# Patient Record
Sex: FEMALE | URBAN - METROPOLITAN AREA
[De-identification: names, ages, dates, MRNs, and addresses within clinical notes are randomized per-mention and may not be internally consistent; named-entity substitution may affect disease eponyms.]

---

## 2023-01-05 ENCOUNTER — NURSE TRIAGE (OUTPATIENT)
Dept: TELEHEALTH | Age: 73
End: 2023-01-05

## 2023-08-01 ENCOUNTER — HOSPITAL ENCOUNTER (OUTPATIENT)
Dept: CV DIAGNOSTICS | Facility: HOSPITAL | Age: 73
Discharge: HOME | End: 2023-08-01
Attending: FAMILY MEDICINE
Payer: MEDICAID

## 2023-08-01 DIAGNOSIS — R60.0 LEG EDEMA: ICD-10-CM

## 2023-08-01 DIAGNOSIS — I10 ESSENTIAL HYPERTENSION: ICD-10-CM

## 2023-08-01 PROCEDURE — 93306 TTE W/DOPPLER COMPLETE: CPT | Performed by: FAMILY MEDICINE

## 2024-11-04 ENCOUNTER — OFFICE VISIT (OUTPATIENT)
Dept: INTERNAL MEDICINE CLINIC | Facility: CLINIC | Age: 74
End: 2024-11-04
Payer: COMMERCIAL

## 2024-11-04 VITALS
SYSTOLIC BLOOD PRESSURE: 156 MMHG | OXYGEN SATURATION: 98 % | DIASTOLIC BLOOD PRESSURE: 80 MMHG | HEART RATE: 70 BPM | BODY MASS INDEX: 39.93 KG/M2 | RESPIRATION RATE: 20 BRPM | HEIGHT: 55.51 IN | WEIGHT: 175 LBS | TEMPERATURE: 98 F

## 2024-11-04 DIAGNOSIS — I10 PRIMARY HYPERTENSION: ICD-10-CM

## 2024-11-04 DIAGNOSIS — R01.1 SYSTOLIC EJECTION MURMUR: ICD-10-CM

## 2024-11-04 DIAGNOSIS — J06.9 VIRAL URI WITH COUGH: Primary | ICD-10-CM

## 2024-11-04 PROBLEM — H90.0 CONDUCTIVE HEARING LOSS, BILATERAL: Status: ACTIVE | Noted: 2024-11-04

## 2024-11-04 PROBLEM — I87.2 VENOUS INSUFFICIENCY: Status: ACTIVE | Noted: 2024-11-04

## 2024-11-04 LAB
COVID19 BINAX NOW ANTIGEN: NOT DETECTED
OPERATOR ID: NORMAL
POCT LOT NUMBER: NORMAL

## 2024-11-04 PROCEDURE — 3008F BODY MASS INDEX DOCD: CPT | Performed by: STUDENT IN AN ORGANIZED HEALTH CARE EDUCATION/TRAINING PROGRAM

## 2024-11-04 PROCEDURE — 99203 OFFICE O/P NEW LOW 30 MIN: CPT | Performed by: STUDENT IN AN ORGANIZED HEALTH CARE EDUCATION/TRAINING PROGRAM

## 2024-11-04 PROCEDURE — 3079F DIAST BP 80-89 MM HG: CPT | Performed by: STUDENT IN AN ORGANIZED HEALTH CARE EDUCATION/TRAINING PROGRAM

## 2024-11-04 PROCEDURE — 3077F SYST BP >= 140 MM HG: CPT | Performed by: STUDENT IN AN ORGANIZED HEALTH CARE EDUCATION/TRAINING PROGRAM

## 2024-11-04 RX ORDER — EPLERENONE 25 MG/1
25 TABLET, FILM COATED ORAL DAILY
COMMUNITY

## 2024-11-04 RX ORDER — BENZONATATE 100 MG/1
100 CAPSULE ORAL 3 TIMES DAILY PRN
Qty: 30 CAPSULE | Refills: 0 | Status: SHIPPED | OUTPATIENT
Start: 2024-11-04

## 2024-11-04 RX ORDER — ATORVASTATIN CALCIUM 20 MG/1
20 TABLET, FILM COATED ORAL NIGHTLY
COMMUNITY

## 2024-11-04 NOTE — PATIENT INSTRUCTIONS
Mucinex   Continue nyquil and dayquil   Flonase 2 puffs each nostril twice a day.       I have prescribed tessalon pearls to help with cough. Can take up to 3 times a day. Please monitor for constipation.     Check blood pressure midday once a day for 7 days. Please send me readings.     Please send me cardiology reports.

## 2024-11-04 NOTE — PROGRESS NOTES
CHIEF COMPLAINT:    Chief Complaint   Patient presents with    New Patient    Viral Syndrome     Post nasal, drip, cough, and cheat congestion symptoms stared x 4 days no fever no at home Covid test taking Dayquil & Nyquil         HPI, Assessment & Plan:    The patient is a 74 year old female with pmhx of HTN, HLD, and systolic ejection murmur who presents with viral symptoms.    //Viral URI   Patient having post nasal drip, cough, chest congestion x 4 days. Denies fever, wheezing, shortness of breath or chest pain. Feels cough is worse at night when laying down. Has tried nyquil and dayquil which has been helping. Denies ear pain.   PLAN:  -Flonase 2 puffs BID.   -Tessalon pearls PRN.   -Continue Nyquil and Dayquil.     //Systolic ejection murmur   Seen cardiologist in [place] and was told she has a leakage. Started on eplerenone and atorvastatin. Her telmisartan was stopped.   PLAN:   -Patient to bring echo report.   -Continue medications as prescribed by cardiologist.   -Based on echo report, will refer to cardiology.     //HTN   Elevated today. Does not check at home  PLAN:   -Continue eplerenone 25mg daily.   -If still elevated at home, plan on restarting telmisartan.     //Hearing loss  PLAN:   May need to discuss audiology visit for hearing aids.     HEALTH MAINTENANCE:   -Vaccinations: Prevnar due, Shingrix due, Flu due, COVID due  -Colonoscopy: - not indicated  -Mammogram: - due  -Pap Smear: - aged out   -Dexa scan: Due  -Diabetes screening: Last A1c value was  % done  .  -Lipid screening: No Lipid panel on file.   -Birth Control: Post-menopausal  -Smoking: denies   -Alcohol: denies  -Marijuana: denies   -Recreational drug: denies    No follow-ups on file.    Elisabeth Dias MD  Internal Medicine     Past Medical History:   Past Medical History:    Essential hypertension        Past Surgical History:   History reviewed. No pertinent surgical history.    Current Medications:    No current outpatient medications  on file.         Allergies:    Allergies as of 11/04/2024    (No Known Allergies)       SOCIAL HISTORY:    Social History     Socioeconomic History    Marital status: Unknown     Spouse name: Not on file    Number of children: Not on file    Years of education: Not on file    Highest education level: Not on file   Occupational History    Not on file   Tobacco Use    Smoking status: Never     Passive exposure: Never    Smokeless tobacco: Never   Vaping Use    Vaping status: Not on file   Substance and Sexual Activity    Alcohol use: Never    Drug use: Never    Sexual activity: Not on file   Other Topics Concern    Not on file   Social History Narrative    Not on file     Social Drivers of Health     Financial Resource Strain: Not on file   Food Insecurity: Not on file   Transportation Needs: Not on file   Physical Activity: Not on file   Stress: Not on file   Social Connections: Not on file   Housing Stability: Not on file       FAMILY HISTORY: History reviewed. No pertinent family history.    REVIEW OF SYSTEMS:  Negative except for what is mentioned in HPI.     PHYSICAL EXAM:   /80 (BP Location: Right arm, Patient Position: Sitting, Cuff Size: large)   Pulse 70   Temp 97.7 °F (36.5 °C) (Temporal)   Resp 20   Ht 4' 7.51\" (1.41 m)   Wt 175 lb (79.4 kg)   SpO2 98%   BMI 39.93 kg/m²  Body mass index is 39.93 kg/m².   General: No acute distress. Alert and oriented x 3.  HEENT: Ear canals clear. TMs pearly gray bilaterally. Throat without erythema or exudates. Nasal turbinates erythematous and edematous, clear nasal discharged noted.   Neck: No lymphadenopathy.  No thyromegaly.   Respiratory: Clear to auscultation bilaterally.  No wheezes, rales, or rhonchi.   Cardiovascular: RRR. Systolic ejection murmur noted at the RUSB. No rubs, or gallops.   Psychiatric: Appropriate mood and affect.    LABS:   None to review.     IMAGING:   None to review.

## 2024-11-05 PROBLEM — R01.1 SYSTOLIC EJECTION MURMUR: Status: RESOLVED | Noted: 2024-11-04 | Resolved: 2024-11-05

## 2024-11-05 PROBLEM — I51.89 GRADE I DIASTOLIC DYSFUNCTION: Status: ACTIVE | Noted: 2024-11-05

## 2024-11-05 PROBLEM — I34.0 MILD MITRAL REGURGITATION: Status: ACTIVE | Noted: 2024-11-05

## 2025-06-17 ENCOUNTER — PATIENT OUTREACH (OUTPATIENT)
Dept: CASE MANAGEMENT | Age: 75
End: 2025-06-17

## 2025-06-17 NOTE — PROCEDURES
The office order for PCP removal request is Approved and finalized on June 17, 2025.    Removed Elisabeth Dias MD as the patient's Primary Care Physician

## 2025-08-09 ENCOUNTER — EXTERNAL LAB (OUTPATIENT)
Dept: HEALTH INFORMATION MANAGEMENT | Facility: OTHER | Age: 75
End: 2025-08-09

## 2025-08-09 LAB
25(OH)D3+25(OH)D2 SERPL-MCNC: 32.76 NG/ML (ref 30–100)
ALBUMIN SERPL-MCNC: 3.9 G/DL (ref 3.3–5.2)
ALP SERPL-CCNC: 92 U/L (ref 35–115)
ALT SERPL-CCNC: 13 U/L (ref 7–48)
AST SERPL-CCNC: 13 U/L (ref 7–48)
BASOPHILS NFR BLD: 0.9 % (ref 0–5)
BILIRUB SERPL-MCNC: 0.4 MG/DL (ref 0.2–1.2)
BUN SERPL-MCNC: 10 MG/DL (ref 6–24)
BUN/CREAT SERPL: 17.9 MG/DL (ref 5–26)
CALCIUM SERPL-MCNC: 9.3 MG/DL (ref 8.3–11)
CHLORIDE SERPL-SCNC: 104 MEQ/L (ref 98–108)
CHOLEST SERPL-MCNC: 220 MG/DL (ref 134–200)
CHOLEST/HDLC SERPL: 3.4 {RATIO} (ref 0–5)
CO2 SERPL-SCNC: 24 MEQ/L (ref 21–31)
CREAT SERPL-MCNC: 0.56 MG/DL (ref 0.6–1.4)
EOSINOPHIL NFR BLD: 1.6 % (ref 0–6)
ERYTHROCYTE [DISTWIDTH] IN BLOOD: 15.4 % (ref 11.5–14.5)
FOLATE SERPL-MCNC: 10.97 NG/ML (ref 5.21–40)
GFR SERPLBLD SCHWARTZ-ARVRAT: 95.7 ML/MIN
GLUCOSE SERPL-MCNC: 85 MG/DL (ref 65–110)
HBA1C MFR BLD: 5.5 % (ref 0–5.7)
HCT VFR BLD CALC: 41.2 % (ref 37–47)
HDLC SERPL-MCNC: 65 MG/DL
HGB BLD-MCNC: 12.8 GM/DL (ref 12.1–15.1)
LDLC SERPL CALC-MCNC: 134.6 MG/DL (ref 30–100)
LENGTH OF FAST TIME PATIENT: ABNORMAL H
LENGTH OF FAST TIME PATIENT: ABNORMAL H
LYMPHOCYTES NFR BLD: 37.6 % (ref 18–47)
MCH RBC QN AUTO: 29.2 PG (ref 27–33)
MCHC RBC AUTO-ENTMCNC: 31.1 G/DL (ref 32–36)
MCV RBC AUTO: 93.9 FL (ref 82–99)
MONOCYTES NFR BLD: 7.3 % (ref 0–12)
NEUTROPHILS # BLD: 3.6 10**3/UL (ref 1.8–7.7)
NEUTROPHILS NFR BLD: 52.6 % (ref 40–75)
NT-PROBNP SERPL-MCNC: 104 PG/ML (ref 0–301)
PLATELET # BLD: 214 10**3/UL (ref 130–400)
PMV BLD AUTO: 8.7 FL (ref 7.4–10.4)
POTASSIUM SERPL-SCNC: 4.4 MEQ/L (ref 3.5–5.3)
PROT SERPL-MCNC: 6.4 G/DL (ref 6–8.5)
RBC # BLD: 4.39 10**6/UL (ref 4.1–5.4)
SODIUM SERPL-SCNC: 139 MEQ/L (ref 135–148)
TRIGL SERPL-MCNC: 102 MG/DL (ref 30–150)
TSH SERPL-ACNC: 2.5 UU/ML (ref 0.35–6)
VIT B12 SERPL-MCNC: 345 PG/ML (ref 180–914)
VLDLC SERPL CALC-MCNC: 20.4 MG/DL (ref 0–120)
WBC # BLD: 6.8 10**3/UL (ref 4–10)

## 2025-08-21 PROBLEM — E78.5 DYSLIPIDEMIA: Status: ACTIVE | Noted: 2025-08-21

## 2025-08-21 PROBLEM — I35.1 NONRHEUMATIC AORTIC VALVE INSUFFICIENCY: Status: ACTIVE | Noted: 2025-08-21

## 2025-08-21 PROBLEM — R01.1 CARDIAC MURMUR: Status: ACTIVE | Noted: 2025-08-21

## 2025-08-21 PROBLEM — I10 ESSENTIAL HYPERTENSION: Status: ACTIVE | Noted: 2025-08-21

## 2025-08-26 ENCOUNTER — TELEPHONE (OUTPATIENT)
Dept: CARDIOLOGY | Age: 75
End: 2025-08-26

## 2025-09-19 ENCOUNTER — APPOINTMENT (OUTPATIENT)
Dept: CARDIOLOGY | Age: 75
End: 2025-09-19
Attending: INTERNAL MEDICINE

## 2025-11-28 ENCOUNTER — APPOINTMENT (OUTPATIENT)
Dept: CARDIOLOGY | Age: 75
End: 2025-11-28
Attending: INTERNAL MEDICINE